# Patient Record
Sex: MALE | Race: BLACK OR AFRICAN AMERICAN | NOT HISPANIC OR LATINO | Employment: PART TIME | URBAN - METROPOLITAN AREA
[De-identification: names, ages, dates, MRNs, and addresses within clinical notes are randomized per-mention and may not be internally consistent; named-entity substitution may affect disease eponyms.]

---

## 2020-06-03 ENCOUNTER — HOSPITAL ENCOUNTER (OUTPATIENT)
Dept: GENERAL RADIOLOGY | Facility: HOSPITAL | Age: 65
Discharge: HOME | End: 2020-06-03
Admitting: INTERNAL MEDICINE

## 2020-06-03 DIAGNOSIS — M17.12 OSTEOARTHRITIS OF LEFT KNEE, UNSPECIFIED OSTEOARTHRITIS TYPE: ICD-10-CM

## 2020-06-03 PROCEDURE — 73560 X-RAY EXAM OF KNEE 1 OR 2: CPT

## 2021-03-22 ENCOUNTER — BULK ORDERING (OUTPATIENT)
Dept: CASE MANAGEMENT | Facility: OTHER | Age: 66
End: 2021-03-22

## 2021-03-22 DIAGNOSIS — Z23 IMMUNIZATION DUE: ICD-10-CM

## 2021-03-25 ENCOUNTER — OFFICE VISIT (OUTPATIENT)
Dept: FAMILY MEDICINE CLINIC | Facility: CLINIC | Age: 66
End: 2021-03-25

## 2021-03-25 VITALS
SYSTOLIC BLOOD PRESSURE: 102 MMHG | TEMPERATURE: 98.7 F | BODY MASS INDEX: 26.82 KG/M2 | HEART RATE: 81 BPM | RESPIRATION RATE: 20 BRPM | WEIGHT: 209 LBS | DIASTOLIC BLOOD PRESSURE: 62 MMHG | OXYGEN SATURATION: 98 % | HEIGHT: 74 IN

## 2021-03-25 DIAGNOSIS — E78.01 FAMILIAL HYPERCHOLESTEROLEMIA: Primary | Chronic | ICD-10-CM

## 2021-03-25 DIAGNOSIS — I10 ESSENTIAL HYPERTENSION: Chronic | ICD-10-CM

## 2021-03-25 PROBLEM — K40.90 INGUINAL HERNIA OF RIGHT SIDE WITHOUT OBSTRUCTION OR GANGRENE: Status: ACTIVE | Noted: 2017-12-11

## 2021-03-25 PROCEDURE — 99203 OFFICE O/P NEW LOW 30 MIN: CPT | Performed by: INTERNAL MEDICINE

## 2021-03-25 RX ORDER — ROSUVASTATIN CALCIUM 10 MG/1
10 TABLET, COATED ORAL
COMMUNITY
Start: 2021-02-04 | End: 2021-08-16 | Stop reason: SDUPTHER

## 2021-03-25 RX ORDER — ALLOPURINOL 100 MG/1
100 TABLET ORAL DAILY
COMMUNITY
Start: 2021-03-02 | End: 2021-08-31

## 2021-03-25 RX ORDER — AMLODIPINE BESYLATE 5 MG/1
10 TABLET ORAL DAILY
COMMUNITY
End: 2021-06-22

## 2021-03-25 RX ORDER — HYDRALAZINE HYDROCHLORIDE 25 MG/1
25 TABLET, FILM COATED ORAL 3 TIMES DAILY
COMMUNITY
End: 2021-06-22

## 2021-03-25 NOTE — PROGRESS NOTES
03/25/2021    CC: Hypertension and Establish Care  .        HPI  Hypertension  This is a chronic problem. The current episode started more than 1 year ago. The problem has been gradually improving since onset. The problem is controlled. Risk factors for coronary artery disease include male gender. Past treatments include beta blockers and ACE inhibitors. Current antihypertension treatment includes ACE inhibitors.        Subjective   Yonatan Hickman Jr. is a 65 y.o. male.      The following portions of the patient's history were reviewed and updated as appropriate: allergies, current medications, past family history, past medical history, past social history, past surgical history and problem list.    Problem List  Patient Active Problem List   Diagnosis   • Inguinal hernia of right side without obstruction or gangrene       Past Medical History  Past Medical History:   Diagnosis Date   • Diabetes mellitus (CMS/HCC)    • Hypertension        Surgical History  History reviewed. No pertinent surgical history.    Family History  Family History   Problem Relation Age of Onset   • Heart disease Mother    • Heart disease Brother        Social History  Social History    Tobacco Use      Smoking status: Current Some Day Smoker        Types: Cigars      Smokeless tobacco: Never Used       Is the Patient a current tobacco user? No    Allergies  No Known Allergies    Current Medications    Current Outpatient Medications:   •  allopurinol (ZYLOPRIM) 100 MG tablet, Take 100 mg by mouth Daily., Disp: , Rfl:   •  amLODIPine (NORVASC) 5 MG tablet, Take 10 mg by mouth Daily., Disp: , Rfl:   •  hydrALAZINE (APRESOLINE) 25 MG tablet, Take 25 mg by mouth 3 (Three) Times a Day., Disp: , Rfl:   •  metFORMIN (GLUCOPHAGE) 500 MG tablet, Take 500 mg by mouth Every Morning., Disp: , Rfl:   •  rosuvastatin (CRESTOR) 10 MG tablet, Take 10 mg by mouth every night at bedtime., Disp: , Rfl:   •  Aspirin Buf,CaCarb-MgCarb-MgO, 81 MG tablet, Take 81  mg by mouth Daily., Disp: , Rfl:      Review of System  Review of Systems   Constitutional: Negative.    Respiratory: Negative.    Cardiovascular: Negative.    Gastrointestinal: Negative.      I have reviewed and confirmed the accuracy of the ROS as documented by the MA/LPN/RN Basim Jacome MD    Vitals:    03/25/21 0740   BP: 102/62   Pulse: 81   Resp: 20   Temp: 98.7 °F (37.1 °C)   SpO2: 98%     Body mass index is 26.83 kg/m².    Objective     Physical Exam  Physical Exam  Cardiovascular:      Rate and Rhythm: Normal rate and regular rhythm.      Pulses: Normal pulses.      Heart sounds: Normal heart sounds.   Pulmonary:      Breath sounds: Normal breath sounds.         Assessment/Plan    This patient presents today for his first visit with me in his office.  This patient relates that he is feeling fine he has had no problems over the past several weeks.  He has a past history of hypertension and hyperlipidemia.  He relates he is taking his medication as prescribed and is maintaining the diet.    His blood pressure today was 102/62 in the left arm sitting position standard cuff.  He is on 2 antihypertensives.    He has a history of diabetes mellitus type 2     We do not have any prior labs to compare.      Diagnoses and all orders for this visit:    1. Familial hypercholesterolemia (Primary)  -     Comprehensive Metabolic Panel  -     Lipid Panel With / Chol / HDL Ratio  -     Urinalysis With Culture If Indicated -  -     CBC & Differential    2. Essential hypertension    Other orders  -     Cancel: Hepatitis C Antibody             Basim Jacome MD  03/25/2021

## 2021-03-26 LAB
ALBUMIN SERPL-MCNC: 4.5 G/DL (ref 3.8–4.8)
ALBUMIN/GLOB SERPL: 1.6 {RATIO} (ref 1.2–2.2)
ALP SERPL-CCNC: 101 IU/L (ref 39–117)
ALT SERPL-CCNC: 23 IU/L (ref 0–44)
AST SERPL-CCNC: 24 IU/L (ref 0–40)
BASOPHILS # BLD AUTO: 0 X10E3/UL (ref 0–0.2)
BASOPHILS NFR BLD AUTO: 1 %
BILIRUB SERPL-MCNC: 0.4 MG/DL (ref 0–1.2)
BUN SERPL-MCNC: 16 MG/DL (ref 8–27)
BUN/CREAT SERPL: 13 (ref 10–24)
CALCIUM SERPL-MCNC: 9.6 MG/DL (ref 8.6–10.2)
CHLORIDE SERPL-SCNC: 101 MMOL/L (ref 96–106)
CHOLEST SERPL-MCNC: 213 MG/DL (ref 100–199)
CHOLEST/HDLC SERPL: 4.3 RATIO (ref 0–5)
CO2 SERPL-SCNC: 23 MMOL/L (ref 20–29)
CREAT SERPL-MCNC: 1.21 MG/DL (ref 0.76–1.27)
EOSINOPHIL # BLD AUTO: 0.3 X10E3/UL (ref 0–0.4)
EOSINOPHIL NFR BLD AUTO: 5 %
ERYTHROCYTE [DISTWIDTH] IN BLOOD BY AUTOMATED COUNT: 14 % (ref 11.6–15.4)
GLOBULIN SER CALC-MCNC: 2.9 G/DL (ref 1.5–4.5)
GLUCOSE SERPL-MCNC: 112 MG/DL (ref 65–99)
HCT VFR BLD AUTO: 44.9 % (ref 37.5–51)
HDLC SERPL-MCNC: 50 MG/DL
HGB BLD-MCNC: 14.5 G/DL (ref 13–17.7)
IMM GRANULOCYTES # BLD AUTO: 0 X10E3/UL (ref 0–0.1)
IMM GRANULOCYTES NFR BLD AUTO: 0 %
LDLC SERPL CALC-MCNC: 121 MG/DL (ref 0–99)
LYMPHOCYTES # BLD AUTO: 1.8 X10E3/UL (ref 0.7–3.1)
LYMPHOCYTES NFR BLD AUTO: 33 %
MCH RBC QN AUTO: 27.1 PG (ref 26.6–33)
MCHC RBC AUTO-ENTMCNC: 32.3 G/DL (ref 31.5–35.7)
MCV RBC AUTO: 84 FL (ref 79–97)
MONOCYTES # BLD AUTO: 0.5 X10E3/UL (ref 0.1–0.9)
MONOCYTES NFR BLD AUTO: 8 %
NEUTROPHILS # BLD AUTO: 3 X10E3/UL (ref 1.4–7)
NEUTROPHILS NFR BLD AUTO: 53 %
PLATELET # BLD AUTO: 248 X10E3/UL (ref 150–450)
POTASSIUM SERPL-SCNC: 4.2 MMOL/L (ref 3.5–5.2)
PROT SERPL-MCNC: 7.4 G/DL (ref 6–8.5)
RBC # BLD AUTO: 5.35 X10E6/UL (ref 4.14–5.8)
SODIUM SERPL-SCNC: 140 MMOL/L (ref 134–144)
TRIGL SERPL-MCNC: 240 MG/DL (ref 0–149)
VLDLC SERPL CALC-MCNC: 42 MG/DL (ref 5–40)
WBC # BLD AUTO: 5.6 X10E3/UL (ref 3.4–10.8)

## 2021-06-22 ENCOUNTER — OFFICE VISIT (OUTPATIENT)
Dept: FAMILY MEDICINE CLINIC | Facility: CLINIC | Age: 66
End: 2021-06-22

## 2021-06-22 VITALS
RESPIRATION RATE: 16 BRPM | HEIGHT: 74 IN | DIASTOLIC BLOOD PRESSURE: 60 MMHG | WEIGHT: 207 LBS | SYSTOLIC BLOOD PRESSURE: 130 MMHG | BODY MASS INDEX: 26.56 KG/M2

## 2021-06-22 DIAGNOSIS — I10 ESSENTIAL HYPERTENSION: Primary | ICD-10-CM

## 2021-06-22 DIAGNOSIS — E11.65 TYPE 2 DIABETES MELLITUS WITH HYPERGLYCEMIA, WITHOUT LONG-TERM CURRENT USE OF INSULIN (HCC): ICD-10-CM

## 2021-06-22 PROCEDURE — 99213 OFFICE O/P EST LOW 20 MIN: CPT | Performed by: INTERNAL MEDICINE

## 2021-06-22 RX ORDER — HYDRALAZINE HYDROCHLORIDE 50 MG/1
50 TABLET, FILM COATED ORAL 2 TIMES DAILY
COMMUNITY
Start: 2021-06-15 | End: 2022-06-09 | Stop reason: SDUPTHER

## 2021-06-22 RX ORDER — AMLODIPINE BESYLATE 10 MG/1
10 TABLET ORAL DAILY
COMMUNITY
Start: 2021-04-05 | End: 2021-07-06

## 2021-06-22 NOTE — PROGRESS NOTES
06/22/2021    CC: Hyperlipidemia (follow up...no other issues)  .        HPI  Hyperlipidemia  This is a chronic problem. The current episode started more than 1 year ago. The problem is uncontrolled. Recent lipid tests were reviewed and are high. Exacerbating diseases include diabetes. There are no known factors aggravating his hyperlipidemia. Current antihyperlipidemic treatment includes statins. The current treatment provides mild improvement of lipids. There are no compliance problems.         Subjective   Yonatan Hickman Jr. is a 65 y.o. male.      The following portions of the patient's history were reviewed and updated as appropriate: allergies, current medications, past family history, past medical history, past social history, past surgical history and problem list.    Problem List  Patient Active Problem List   Diagnosis   • Inguinal hernia of right side without obstruction or gangrene       Past Medical History  Past Medical History:   Diagnosis Date   • Diabetes mellitus (CMS/HCC)    • Hypertension        Surgical History  History reviewed. No pertinent surgical history.    Family History  Family History   Problem Relation Age of Onset   • Heart disease Mother    • Heart disease Brother        Social History  Social History    Tobacco Use      Smoking status: Current Some Day Smoker        Types: Cigars      Smokeless tobacco: Never Used       Is the Patient a current tobacco user? No    Allergies  No Known Allergies    Current Medications    Current Outpatient Medications:   •  allopurinol (ZYLOPRIM) 100 MG tablet, Take 100 mg by mouth Daily., Disp: , Rfl:   •  amLODIPine (NORVASC) 10 MG tablet, Take 10 mg by mouth Daily., Disp: , Rfl:   •  hydrALAZINE (APRESOLINE) 50 MG tablet, Take 50 mg by mouth 2 (two) times a day., Disp: , Rfl:   •  metFORMIN (GLUCOPHAGE) 500 MG tablet, Take 1 tablet by mouth Every Morning., Disp: 90 tablet, Rfl: 1  •  rosuvastatin (CRESTOR) 10 MG tablet, Take 10 mg by mouth every  night at bedtime., Disp: , Rfl:      Review of System  Review of Systems   Cardiovascular: Negative.    Gastrointestinal: Negative.    Endocrine: Negative.      I have reviewed and confirmed the accuracy of the ROS as documented by the MA/LPN/RN Basim Jacome MD    Vitals:    06/22/21 0816   BP: 130/60   Resp: 16     Body mass index is 26.58 kg/m².    Objective     Physical Exam  Physical Exam  Vitals and nursing note reviewed.   Cardiovascular:      Rate and Rhythm: Regular rhythm.      Heart sounds: Normal heart sounds.   Pulmonary:      Effort: Pulmonary effort is normal.   Abdominal:      Palpations: Abdomen is soft.         Assessment/Plan      This pleasant 65-year-old presents at this time for follow-up of hypertension and diabetes mellitus type 2 diet-controlled.  He relates he is feeling fine has had no problems in the interim of visits.  He was seen last in March of this year.  I reviewed his labs from that time showing normal CBC and a normal comprehensive metabolic profile.  His lipid profile was slightly elevated with an LDL of 125.  He previously was on Crestor 10 mg p.o. daily we increase that to 20 mg.  He is not fasting today so we did not have a valid lab opportunity.    Overall he is doing well at this point he continues to play tennis several days a week.                Diagnoses and all orders for this visit:    1. Essential hypertension (Primary)  -     Hepatitis C Antibody    2. Type 2 diabetes mellitus with hyperglycemia, without long-term current use of insulin (CMS/[facility])  -     Hemoglobin A1c      Plan:  1.)  Hemoglobin A1c to evaluate his diet-controlled diabetes  2.)  Schedule for a Medicare wellness review  3.)  Continue Crestor 20 mg p.o. daily.       Basim Jacome MD  06/22/2021

## 2021-06-23 LAB
HBA1C MFR BLD: 6.7 % (ref 4.8–5.6)
HCV AB S/CO SERPL IA: <0.1 S/CO RATIO (ref 0–0.9)

## 2021-07-06 RX ORDER — AMLODIPINE BESYLATE 10 MG/1
TABLET ORAL
Qty: 90 TABLET | Refills: 3 | Status: SHIPPED | OUTPATIENT
Start: 2021-07-06 | End: 2021-11-03

## 2021-08-16 DIAGNOSIS — E78.01 FAMILIAL HYPERCHOLESTEROLEMIA: Primary | ICD-10-CM

## 2021-08-16 RX ORDER — ROSUVASTATIN CALCIUM 10 MG/1
10 TABLET, COATED ORAL
Qty: 90 TABLET | Refills: 1 | Status: SHIPPED | OUTPATIENT
Start: 2021-08-16 | End: 2022-03-04

## 2021-08-31 RX ORDER — ALLOPURINOL 100 MG/1
TABLET ORAL
Qty: 90 TABLET | Refills: 3 | Status: SHIPPED | OUTPATIENT
Start: 2021-08-31 | End: 2022-09-13

## 2021-10-20 ENCOUNTER — TELEPHONE (OUTPATIENT)
Dept: FAMILY MEDICINE CLINIC | Facility: CLINIC | Age: 66
End: 2021-10-20

## 2021-10-20 RX ORDER — TADALAFIL 20 MG/1
TABLET ORAL
Qty: 12 TABLET | Refills: 2 | Status: SHIPPED | OUTPATIENT
Start: 2021-10-20 | End: 2021-10-27 | Stop reason: SDUPTHER

## 2021-10-20 NOTE — TELEPHONE ENCOUNTER
Caller: Yonatan Hickman Jr.    Relationship: Self    Best call back number: [phone]    What medication are you requesting: TADALAFIL 20MG QTY 30 TAKE 1 A DAY    Have you had these symptoms before:    [x] Yes  [] No    Have you been treated for these symptoms before:   [x] Yes  [] No    If a prescription is needed, what is your preferred pharmacy and phone number: [facility] [address] [facility] [address] [facility] [phone] [facility] [phone] FX     Additional notes:  PATIENT STATES THAT HE HAS RECEIVED THIS MEDICATION BEFORE AND NEEDS A REFILL OF IT.     PLEASE CALL AND ADVISE

## 2021-10-26 NOTE — TELEPHONE ENCOUNTER
PATIENT CALLED AGAIN ON THIS MEDICATION.  HE IS OUT OF THE MEDICATION.  PLEASE ADVISE    CALL BACK #: [phone]

## 2021-10-26 NOTE — TELEPHONE ENCOUNTER
I called pt and left message on machine that Dr. Jacome did send in his prescription to the [facility] off [address] on 10/20.  Left message to ask if he wanted him to send it in to the [facility] please inform patient

## 2021-10-27 RX ORDER — TADALAFIL 20 MG/1
TABLET ORAL
Qty: 12 TABLET | Refills: 2 | Status: SHIPPED | OUTPATIENT
Start: 2021-10-27 | End: 2023-02-10

## 2021-10-27 NOTE — TELEPHONE ENCOUNTER
HUB READ TO PATIENT HE VERIFIED HE WANTS THIS SENT TO THE [facility] ON FILE.      CONFIRMED PHARMACY  [place] [id number] [facility], [place] - [address] [facility] [phone] [facility] [phone] FX

## 2021-11-03 RX ORDER — AMLODIPINE BESYLATE 10 MG/1
TABLET ORAL
Qty: 90 TABLET | Refills: 3 | Status: SHIPPED | OUTPATIENT
Start: 2021-11-03 | End: 2022-07-11

## 2022-03-04 DIAGNOSIS — E78.01 FAMILIAL HYPERCHOLESTEROLEMIA: ICD-10-CM

## 2022-03-04 RX ORDER — ROSUVASTATIN CALCIUM 10 MG/1
TABLET, COATED ORAL
Qty: 90 TABLET | Refills: 1 | Status: SHIPPED | OUTPATIENT
Start: 2022-03-04 | End: 2022-10-06 | Stop reason: SDUPTHER

## 2022-06-09 RX ORDER — HYDRALAZINE HYDROCHLORIDE 50 MG/1
50 TABLET, FILM COATED ORAL 2 TIMES DAILY
Qty: 180 TABLET | Refills: 1 | Status: SHIPPED | OUTPATIENT
Start: 2022-06-09 | End: 2022-12-05

## 2022-06-09 NOTE — TELEPHONE ENCOUNTER
Caller: Yonatan Hickman Jr.    Relationship: Self    Best call back number: [phone] (H)    Requested Prescriptions:   hydrALAZINE (APRESOLINE) 50 MG tablet     Pharmacy where request should be sent: [facility]/pharmacy #[id number] - [facility] [place] - [address] AT [facility] [phone] [facility] [phone] FX         Additional details provided by patient: PATIENT CALLED TO REQUEST A MEDICATION REFILL ON HIS MEDICATION. PATIENT STATES THAT HE IS COMPLETELY OUT MEDICATION.      PLEASE CONTACT PATIENT TO ADVISE ONCE PRESCRIPTION HAS BEEN SENT TO THE [facility].      Does the patient have less than a 3 day supply:  [x] Yes  [] No    Nehal Gallardo Rep   06/09/22 09:57 EDT         THANKS

## 2022-07-11 RX ORDER — AMLODIPINE BESYLATE 10 MG/1
TABLET ORAL
Qty: 90 TABLET | Refills: 3 | Status: SHIPPED | OUTPATIENT
Start: 2022-07-11

## 2022-09-13 RX ORDER — ALLOPURINOL 100 MG/1
TABLET ORAL
Qty: 90 TABLET | Refills: 3 | Status: SHIPPED | OUTPATIENT
Start: 2022-09-13

## 2022-10-04 ENCOUNTER — OFFICE VISIT (OUTPATIENT)
Dept: FAMILY MEDICINE CLINIC | Facility: CLINIC | Age: 67
End: 2022-10-04

## 2022-10-04 VITALS
WEIGHT: 204.8 LBS | BODY MASS INDEX: 26.28 KG/M2 | SYSTOLIC BLOOD PRESSURE: 140 MMHG | DIASTOLIC BLOOD PRESSURE: 96 MMHG | RESPIRATION RATE: 16 BRPM | HEIGHT: 74 IN

## 2022-10-04 DIAGNOSIS — Z12.11 COLON CANCER SCREENING: ICD-10-CM

## 2022-10-04 DIAGNOSIS — E78.5 HYPERLIPIDEMIA, UNSPECIFIED HYPERLIPIDEMIA TYPE: ICD-10-CM

## 2022-10-04 DIAGNOSIS — I10 PRIMARY HYPERTENSION: Primary | ICD-10-CM

## 2022-10-04 DIAGNOSIS — R73.09 ELEVATED GLUCOSE: ICD-10-CM

## 2022-10-04 DIAGNOSIS — E11.65 TYPE 2 DIABETES MELLITUS WITH HYPERGLYCEMIA, WITHOUT LONG-TERM CURRENT USE OF INSULIN: ICD-10-CM

## 2022-10-04 DIAGNOSIS — I10 ESSENTIAL HYPERTENSION: ICD-10-CM

## 2022-10-04 DIAGNOSIS — Z13.0 SCREENING FOR DEFICIENCY ANEMIA: ICD-10-CM

## 2022-10-04 DIAGNOSIS — R35.1 NOCTURIA: ICD-10-CM

## 2022-10-04 PROCEDURE — 1170F FXNL STATUS ASSESSED: CPT | Performed by: INTERNAL MEDICINE

## 2022-10-04 PROCEDURE — G0439 PPPS, SUBSEQ VISIT: HCPCS | Performed by: INTERNAL MEDICINE

## 2022-10-04 PROCEDURE — 1159F MED LIST DOCD IN RCRD: CPT | Performed by: INTERNAL MEDICINE

## 2022-10-04 NOTE — PROGRESS NOTES
The ABCs of the Annual Wellness Visit  Subsequent Medicare Wellness Visit    Chief Complaint   Patient presents with   • Medicare Wellness-subsequent     No other issues      Subjective    History of Present Illness:  Yonatan Hickman Jr. is a 66 y.o. male who presents for a Subsequent Medicare Wellness Visit.    The following portions of the patient's history were reviewed and   updated as appropriate: allergies, current medications, past family history, past medical history, past social history, past surgical history and problem list.    Compared to one year ago, the patient feels his physical   health is the same.    Compared to one year ago, the patient feels his mental   health is better.    Recent Hospitalizations:  He was not admitted to the hospital during the last year.       Current Medical Providers:  Patient Care Team:  Basim Jacome MD as PCP - General (Internal Medicine)    Outpatient Medications Prior to Visit   Medication Sig Dispense Refill   • allopurinol (ZYLOPRIM) 100 MG tablet TAKE 1 TABLET BY MOUTH EVERY DAY 90 tablet 3   • amLODIPine (NORVASC) 10 MG tablet TAKE 1 TABLET BY MOUTH EVERY DAY 90 tablet 3   • hydrALAZINE (APRESOLINE) 50 MG tablet Take 1 tablet by mouth 2 (Two) Times a Day. 180 tablet 1   • metFORMIN (GLUCOPHAGE) 500 MG tablet TAKE 1 TABLET BY MOUTH EVERY MORNING. 90 tablet 1   • rosuvastatin (CRESTOR) 10 MG tablet TAKE 1 TABLET BY MOUTH EVERYDAY AT BEDTIME 90 tablet 1   • tadalafil (Cialis) 20 MG tablet Use weekly as needed 12 tablet 2     No facility-administered medications prior to visit.       No opioid medication identified on active medication list. I have reviewed chart for other potential  high risk medication/s and harmful drug interactions in the elderly.          Aspirin is not on active medication list.      Patient Active Problem List   Diagnosis   • Inguinal hernia of right side without obstruction or gangrene     Advance Care Planning  Advance Directive is not on  "file.  ACP discussion was held with the patient during this visit. Patient does not have an advance directive, information provided.    Review of Systems   Constitutional: Negative.    HENT: Negative.    Eyes: Negative.    Respiratory: Negative.    Cardiovascular: Negative.    Gastrointestinal: Negative.    Endocrine: Negative.    Genitourinary: Negative.    Musculoskeletal: Negative.    Skin: Negative.    Allergic/Immunologic: Negative.    Neurological: Negative.    Hematological: Negative.    Psychiatric/Behavioral: Negative.          Objective    Vitals:    10/04/22 0748   BP: 140/96   Resp: 16   Weight: 92.9 kg (204 lb 12.8 oz)   Height: 188 cm (74\")     Body mass index is 26.29 kg/m².  BMI has not been calculated during today's encounter.     Does the patient have evidence of cognitive impairment? No    Physical Exam  Vitals and nursing note reviewed.   Constitutional:       Appearance: He is well-developed.   HENT:      Head: Normocephalic and atraumatic.   Eyes:      Conjunctiva/sclera: Conjunctivae normal.   Cardiovascular:      Rate and Rhythm: Normal rate and regular rhythm.      Heart sounds: Normal heart sounds.   Pulmonary:      Effort: Pulmonary effort is normal.      Breath sounds: Normal breath sounds.   Abdominal:      General: Bowel sounds are normal.      Palpations: Abdomen is soft.   Musculoskeletal:         General: Normal range of motion.      Cervical back: Normal range of motion and neck supple.   Skin:     General: Skin is warm and dry.   Neurological:      Mental Status: He is alert and oriented to person, place, and time.   Psychiatric:         Behavior: Behavior normal.                 HEALTH RISK ASSESSMENT    Smoking Status:  Social History     Tobacco Use   Smoking Status Current Some Day Smoker   • Packs/day: 0.25   • Years: 10.00   • Pack years: 2.50   • Types: Cigars   • Start date: 2012   Smokeless Tobacco Never Used     Alcohol Consumption:  Social History     Substance and " Sexual Activity   Alcohol Use Yes     Fall Risk Screen:    MUKESH Fall Risk Assessment was completed, and patient is at LOW risk for falls.Assessment completed on:10/4/2022    Depression Screening:  PHQ-2/PHQ-9 Depression Screening 10/4/2022   Retired Total Score -   Little Interest or Pleasure in Doing Things 0-->not at all   Feeling Down, Depressed or Hopeless 0-->not at all   PHQ-9: Brief Depression Severity Measure Score 0       Health Habits and Functional and Cognitive Screening:  Functional & Cognitive Status 10/4/2022   Do you have difficulty preparing food and eating? No   Do you have difficulty bathing yourself, getting dressed or grooming yourself? No   Do you have difficulty using the toilet? No   Do you have difficulty moving around from place to place? No   Do you have trouble with steps or getting out of a bed or a chair? No   Current Diet Well Balanced Diet   Dental Exam Up to date   Eye Exam Not up to date   Exercise (times per week) 7 times per week   Current Exercises Include Tennis;Bicycling Outdoors;Stationary Bicycling/Spin Class   Do you need help using the phone?  No   Are you deaf or do you have serious difficulty hearing?  No   Do you need help with transportation? No   Do you need help shopping? No   Do you need help preparing meals?  No   Do you need help with housework?  No   Do you need help with laundry? No   Do you need help taking your medications? No   Do you need help managing money? No   Do you ever drive or ride in a car without wearing a seat belt? No   Have you felt unusual stress, anger or loneliness in the last month? No   Who do you live with? Spouse   If you need help, do you have trouble finding someone available to you? No   Have you been bothered in the last four weeks by sexual problems? No   Do you have difficulty concentrating, remembering or making decisions? No       Age-appropriate Screening Schedule:  Refer to the list below for future screening recommendations  based on patient's age, sex and/or medical conditions. Orders for these recommended tests are listed in the plan section. The patient has been provided with a written plan.    Health Maintenance   Topic Date Due   • URINE MICROALBUMIN  Never done   • TDAP/TD VACCINES (1 - Tdap) Never done   • DIABETIC FOOT EXAM  Never done   • DIABETIC EYE EXAM  Never done   • HEMOGLOBIN A1C  12/22/2021   • LIPID PANEL  03/25/2022   • INFLUENZA VACCINE  08/01/2022   • ZOSTER VACCINE (1 of 2) 06/14/2023 (Originally 11/8/2004)              Assessment & Plan      This 66-year-old patient presents today for Medicare wellness review.  He relates he is feeling well having had no problems in the interim of visits.  He does have a history of diabetes mellitus type 2 and has been lost to follow-up for about a year.  His last hemoglobin A1c was 6.7.    Regarding anticipatory guidance: We discussed the importance of a low-cholesterol diet.  I gave him a low-cholesterol diet sheet for review.      CMS Preventative Services Quick Reference  Risk Factors Identified During Encounter  None Identified  The above risks/problems have been discussed with the patient.  Follow up actions/plans if indicated are seen below in the Assessment/Plan Section.  Pertinent information has been shared with the patient in the After Visit Summary.    Diagnoses and all orders for this visit:    1. Primary hypertension (Primary)  -     Comprehensive Metabolic Panel    2. Hyperlipidemia, unspecified hyperlipidemia type  -     Lipid Panel With / Chol / HDL Ratio    3. Nocturia  -     Urinalysis With Culture If Indicated -    4. Screening for deficiency anemia  -     CBC & Differential    5. Elevated glucose  -     Hemoglobin A1c    6. Essential hypertension    7. Type 2 diabetes mellitus with hyperglycemia, without long-term current use of insulin (HCC)        Follow Up:   Return in about 4 months (around 2/4/2023) for Diabetic follow-up.     An After Visit Summary and  PPPS were made available to the patient.             Plan:  1.)  Follow-up in 4 months for diabetes  2.)  Comprehensive metabolic profile, CBC.

## 2022-10-05 LAB
ALBUMIN SERPL-MCNC: 4.5 G/DL (ref 3.5–5.2)
ALBUMIN/GLOB SERPL: 1.9 G/DL
ALP SERPL-CCNC: 82 U/L (ref 39–117)
ALT SERPL-CCNC: 15 U/L (ref 1–41)
AST SERPL-CCNC: 26 U/L (ref 1–40)
BASOPHILS # BLD AUTO: 0.05 10*3/MM3 (ref 0–0.2)
BASOPHILS NFR BLD AUTO: 1.2 % (ref 0–1.5)
BILIRUB SERPL-MCNC: 0.4 MG/DL (ref 0–1.2)
BUN SERPL-MCNC: 9 MG/DL (ref 8–23)
BUN/CREAT SERPL: 8.5 (ref 7–25)
CALCIUM SERPL-MCNC: 9.5 MG/DL (ref 8.6–10.5)
CHLORIDE SERPL-SCNC: 103 MMOL/L (ref 98–107)
CHOLEST SERPL-MCNC: 315 MG/DL (ref 0–200)
CHOLEST/HDLC SERPL: 5 {RATIO}
CO2 SERPL-SCNC: 28 MMOL/L (ref 22–29)
CREAT SERPL-MCNC: 1.06 MG/DL (ref 0.76–1.27)
EGFRCR SERPLBLD CKD-EPI 2021: 76.9 ML/MIN/1.73
EOSINOPHIL # BLD AUTO: 0.19 10*3/MM3 (ref 0–0.4)
EOSINOPHIL NFR BLD AUTO: 4.5 % (ref 0.3–6.2)
ERYTHROCYTE [DISTWIDTH] IN BLOOD BY AUTOMATED COUNT: 13.6 % (ref 12.3–15.4)
GLOBULIN SER CALC-MCNC: 2.4 GM/DL
GLUCOSE SERPL-MCNC: 122 MG/DL (ref 65–99)
HBA1C MFR BLD: 6.8 % (ref 4.8–5.6)
HCT VFR BLD AUTO: 44.6 % (ref 37.5–51)
HDLC SERPL-MCNC: 63 MG/DL (ref 40–60)
HGB BLD-MCNC: 14.7 G/DL (ref 13–17.7)
IMM GRANULOCYTES # BLD AUTO: 0.01 10*3/MM3 (ref 0–0.05)
IMM GRANULOCYTES NFR BLD AUTO: 0.2 % (ref 0–0.5)
LDLC SERPL CALC-MCNC: 225 MG/DL (ref 0–100)
LYMPHOCYTES # BLD AUTO: 1.27 10*3/MM3 (ref 0.7–3.1)
LYMPHOCYTES NFR BLD AUTO: 30 % (ref 19.6–45.3)
MCH RBC QN AUTO: 27.5 PG (ref 26.6–33)
MCHC RBC AUTO-ENTMCNC: 33 G/DL (ref 31.5–35.7)
MCV RBC AUTO: 83.4 FL (ref 79–97)
MONOCYTES # BLD AUTO: 0.41 10*3/MM3 (ref 0.1–0.9)
MONOCYTES NFR BLD AUTO: 9.7 % (ref 5–12)
NEUTROPHILS # BLD AUTO: 2.3 10*3/MM3 (ref 1.7–7)
NEUTROPHILS NFR BLD AUTO: 54.4 % (ref 42.7–76)
NRBC BLD AUTO-RTO: 0 /100 WBC (ref 0–0.2)
PLATELET # BLD AUTO: 244 10*3/MM3 (ref 140–450)
POTASSIUM SERPL-SCNC: 4.3 MMOL/L (ref 3.5–5.2)
PROT SERPL-MCNC: 6.9 G/DL (ref 6–8.5)
RBC # BLD AUTO: 5.35 10*6/MM3 (ref 4.14–5.8)
SODIUM SERPL-SCNC: 138 MMOL/L (ref 136–145)
TRIGL SERPL-MCNC: 148 MG/DL (ref 0–150)
VLDLC SERPL CALC-MCNC: 27 MG/DL (ref 5–40)
WBC # BLD AUTO: 4.23 10*3/MM3 (ref 3.4–10.8)

## 2022-10-06 DIAGNOSIS — E78.5 HYPERLIPIDEMIA, UNSPECIFIED HYPERLIPIDEMIA TYPE: Primary | ICD-10-CM

## 2022-10-06 DIAGNOSIS — E78.01 FAMILIAL HYPERCHOLESTEROLEMIA: ICD-10-CM

## 2022-10-06 RX ORDER — ROSUVASTATIN CALCIUM 20 MG/1
TABLET, COATED ORAL
Qty: 90 TABLET | Refills: 3 | Status: SHIPPED | OUTPATIENT
Start: 2022-10-06 | End: 2022-12-15 | Stop reason: SDUPTHER

## 2022-12-05 RX ORDER — HYDRALAZINE HYDROCHLORIDE 50 MG/1
TABLET, FILM COATED ORAL
Qty: 180 TABLET | Refills: 1 | Status: SHIPPED | OUTPATIENT
Start: 2022-12-05

## 2022-12-15 ENCOUNTER — OFFICE VISIT (OUTPATIENT)
Dept: FAMILY MEDICINE CLINIC | Facility: CLINIC | Age: 67
End: 2022-12-15

## 2022-12-15 VITALS
BODY MASS INDEX: 25.8 KG/M2 | WEIGHT: 201 LBS | HEIGHT: 74 IN | RESPIRATION RATE: 16 BRPM | SYSTOLIC BLOOD PRESSURE: 130 MMHG | DIASTOLIC BLOOD PRESSURE: 70 MMHG

## 2022-12-15 DIAGNOSIS — E78.01 FAMILIAL HYPERCHOLESTEROLEMIA: ICD-10-CM

## 2022-12-15 DIAGNOSIS — J01.80 OTHER ACUTE SINUSITIS, RECURRENCE NOT SPECIFIED: Primary | ICD-10-CM

## 2022-12-15 DIAGNOSIS — R42 VERTIGO: ICD-10-CM

## 2022-12-15 PROCEDURE — 99214 OFFICE O/P EST MOD 30 MIN: CPT | Performed by: INTERNAL MEDICINE

## 2022-12-15 RX ORDER — MECLIZINE HYDROCHLORIDE 25 MG/1
TABLET ORAL
Qty: 20 TABLET | Refills: 0 | Status: SHIPPED | OUTPATIENT
Start: 2022-12-15 | End: 2022-12-27

## 2022-12-15 RX ORDER — FEXOFENADINE HCL AND PSEUDOEPHEDRINE HCI 180; 240 MG/1; MG/1
TABLET, EXTENDED RELEASE ORAL
Qty: 10 TABLET | Refills: 0 | Status: SHIPPED | OUTPATIENT
Start: 2022-12-15

## 2022-12-15 RX ORDER — ROSUVASTATIN CALCIUM 20 MG/1
TABLET, COATED ORAL
Qty: 90 TABLET | Refills: 3 | Status: SHIPPED | OUTPATIENT
Start: 2022-12-15

## 2022-12-15 NOTE — PROGRESS NOTES
12/15/2022    CC: Dizziness (Started 5 days ago off/on.---no other issues)  .        HPI  History of Present Illness  This 67-year-old patient presents today for evaluation of dizziness that started about 5 days or so ago.  He relates it is episodic in nature.  He relates he originally awoke awakened with it but after several minutes it seemed to go away but then came back.       Subjective   Yonatan Hickman Jr. is a 67 y.o. male.      The following portions of the patient's history were reviewed and updated as appropriate: allergies, current medications, past family history, past medical history, past social history, past surgical history and problem list.    Problem List  Patient Active Problem List   Diagnosis   • Inguinal hernia of right side without obstruction or gangrene       Past Medical History  Past Medical History:   Diagnosis Date   • Diabetes mellitus (HCC)    • Hypertension        Surgical History  History reviewed. No pertinent surgical history.    Family History  Family History   Problem Relation Age of Onset   • Heart disease Mother    • Heart disease Brother        Social History  Social History    Tobacco Use      Smoking status: Some Days        Packs/day: 0.25        Years: 10.00        Pack years: 2.5        Types: Cigars, Cigarettes        Start date: 2012      Smokeless tobacco: Never       Is the Patient a current tobacco user? No    Allergies  No Known Allergies    Current Medications    Current Outpatient Medications:   •  allopurinol (ZYLOPRIM) 100 MG tablet, TAKE 1 TABLET BY MOUTH EVERY DAY, Disp: 90 tablet, Rfl: 3  •  amLODIPine (NORVASC) 10 MG tablet, TAKE 1 TABLET BY MOUTH EVERY DAY, Disp: 90 tablet, Rfl: 3  •  hydrALAZINE (APRESOLINE) 50 MG tablet, TAKE 1 TABLET BY MOUTH TWICE A DAY, Disp: 180 tablet, Rfl: 1  •  metFORMIN (GLUCOPHAGE) 500 MG tablet, TAKE 1 TABLET BY MOUTH EVERY DAY IN THE MORNING, Disp: 90 tablet, Rfl: 1  •  tadalafil (Cialis) 20 MG tablet, Use weekly as needed,  Disp: 12 tablet, Rfl: 2  •  rosuvastatin (CRESTOR) 20 MG tablet, Take 1 tablet at bedtime, Disp: 90 tablet, Rfl: 3     Review of System  Review of Systems   Constitutional: Negative.    HENT: Negative.    Eyes: Negative.    Respiratory: Negative.    Cardiovascular: Negative.      I have reviewed and confirmed the accuracy of the ROS as documented by the MA/LPN/RN Basim Jacome MD    Vitals:    12/15/22 0749   BP: 130/70   Resp: 16     Body mass index is 25.81 kg/m².    Objective     Physical Exam  Physical Exam  Constitutional:       Appearance: Normal appearance.   HENT:      Head: Normocephalic.   Eyes:      Extraocular Movements: Extraocular movements intact.      Conjunctiva/sclera: Conjunctivae normal.      Pupils: Pupils are equal, round, and reactive to light.   Cardiovascular:      Rate and Rhythm: Normal rate and regular rhythm.      Pulses: Normal pulses.      Heart sounds: Normal heart sounds.   Pulmonary:      Effort: Pulmonary effort is normal.   Musculoskeletal:      Cervical back: Normal range of motion.   Neurological:      Mental Status: He is alert.         Assessment & Plan    This 67-year-old patient presents at this time for recent onset of vertigo x3 to 4 days.  He is able to ambulate without difficulty but feels unsteady.  He is able to drive without problems.  He relates that it started around Sunday morning when he awakened from his beer.  After a while he felt nauseated threw up and then felt a little better.  Since then its been very bothersome.  He relates he had similar symptoms years ago when he was told he had some sinus problems.    Tympanic membrane was bulging.  Remainder of the ear nose and throat exam was normal.    Epley maneuver was negative.  There is no nystagmus.    His blood pressure is 130/70 in the left arm sitting position standard cuff.  He is lost 3 pounds from his last visit.    Diagnoses and all orders for this visit:    1. Other acute sinusitis, recurrence not  specified (Primary)    2. Vertigo      Plan:  1.)  Allegra-D 1 tab p.o. nightly  2.)  Meclizine 25 mg 1 tab p.o. twice daily.  3.)  The patient is to call me if no improvement in 48 hours.       Basim Jacome MD  12/15/2022

## 2022-12-27 RX ORDER — MECLIZINE HYDROCHLORIDE 25 MG/1
TABLET ORAL
Qty: 20 TABLET | Refills: 0 | Status: SHIPPED | OUTPATIENT
Start: 2022-12-27 | End: 2023-02-08

## 2023-02-08 RX ORDER — MECLIZINE HYDROCHLORIDE 25 MG/1
TABLET ORAL
Qty: 20 TABLET | Refills: 0 | Status: SHIPPED | OUTPATIENT
Start: 2023-02-08

## 2023-02-08 NOTE — TELEPHONE ENCOUNTER
Rx Refill Note  Requested Prescriptions     Pending Prescriptions Disp Refills   • meclizine (ANTIVERT) 25 MG tablet [Pharmacy Med Name: MECLIZINE 25 MG TABLET] 20 tablet 0     Sig: TAKE 1 TABLET BY MOUTH TWICE A DAY      Last office visit with prescribing clinician: 12/15/2022   Last telemedicine visit with prescribing clinician: Visit date not found   Next office visit with prescribing clinician: Visit date not found                         Would you like a call back once the refill request has been completed: [] Yes [] No    If the office needs to give you a call back, can they leave a voicemail: [] Yes [] No    Jake Dennison MA  02/08/23, 07:09 EST

## 2023-02-10 RX ORDER — TADALAFIL 20 MG/1
TABLET ORAL
Qty: 12 TABLET | Refills: 4 | Status: SHIPPED | OUTPATIENT
Start: 2023-02-10

## 2023-02-10 RX ORDER — TADALAFIL 20 MG/1
TABLET ORAL
Qty: 12 TABLET | Refills: 0 | Status: SHIPPED | OUTPATIENT
Start: 2023-02-10 | End: 2023-02-10 | Stop reason: SDUPTHER

## 2023-02-10 NOTE — TELEPHONE ENCOUNTER
Rx Refill Note  Requested Prescriptions     Pending Prescriptions Disp Refills   • tadalafil (CIALIS) 20 MG tablet [Pharmacy Med Name: Tadalafil 20 MG Oral Tablet] 12 tablet 0     Sig: TAKE 1 TABLET BY MOUTH ONCE A WEEK AS NEEDED      Last office visit with prescribing clinician: 12/15/2022   Last telemedicine visit with prescribing clinician: 2/10/2023   Next office visit with prescribing clinician: 2/10/2023                         Would you like a call back once the refill request has been completed: [] Yes [] No    If the office needs to give you a call back, can they leave a voicemail: [] Yes [] No    Jake Dennison MA  02/10/23, 09:05 EST

## 2023-08-24 ENCOUNTER — OFFICE VISIT (OUTPATIENT)
Dept: FAMILY MEDICINE CLINIC | Facility: CLINIC | Age: 68
End: 2023-08-24
Payer: MEDICARE

## 2023-08-24 VITALS
BODY MASS INDEX: 26.56 KG/M2 | WEIGHT: 207 LBS | HEIGHT: 74 IN | DIASTOLIC BLOOD PRESSURE: 78 MMHG | SYSTOLIC BLOOD PRESSURE: 110 MMHG

## 2023-08-24 DIAGNOSIS — Z13.6 SCREENING FOR HYPERTENSION: Primary | ICD-10-CM

## 2023-08-24 DIAGNOSIS — R35.1 NOCTURIA: ICD-10-CM

## 2023-08-24 DIAGNOSIS — Z11.1 TUBERCULOSIS SCREENING: ICD-10-CM

## 2023-08-24 DIAGNOSIS — Z13.0 SCREENING FOR DEFICIENCY ANEMIA: ICD-10-CM

## 2023-08-24 DIAGNOSIS — Z13.220 SCREENING FOR HYPERLIPIDEMIA: ICD-10-CM

## 2023-08-24 DIAGNOSIS — Z13.29 SCREENING FOR HYPOTHYROIDISM: ICD-10-CM

## 2023-08-24 DIAGNOSIS — E11.43 TYPE 2 DIABETES MELLITUS WITH DIABETIC AUTONOMIC NEUROPATHY, WITHOUT LONG-TERM CURRENT USE OF INSULIN: ICD-10-CM

## 2023-08-24 NOTE — PROGRESS NOTES
08/24/2023    Assessment & Plan     This 67-year-old patient presents at this time for follow-up of diabetes mellitus and for completion of physical examination.  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  He relates not had any chest pain or shortness of breath in the interim of visits.  Unfortunately he has diabetes mellitus type 2 and has not been followed up since October 2022.  Patient relates he feels fine and does not think that he really needs to be checked.  Diagnoses and all orders for this visit:    1. Screening for hypertension (Primary)  -     Comprehensive Metabolic Panel    2. Screening for hyperlipidemia  -     Lipid Panel With / Chol / HDL Ratio    3. Nocturia  -     Urinalysis With Culture If Indicated -    4. Screening for deficiency anemia  -     CBC & Differential    5. Screening for hypothyroidism  -     T4, Free  -     TSH    6. Type 2 diabetes mellitus with diabetic autonomic neuropathy, without long-term current use of insulin  -     Hemoglobin A1c    7. Tuberculosis screening  -     QuantiFERON-TB Gold Plus; Future    Other orders  -     Microscopic Examination -  -     QuantiFERON-TB Gold Plus  -     QuantiFERON-TB Gold Plus      Plan:  1.)  Follow-up for diabetes in the next few months       CC: Hypertension (F/U--form for work----no other issues)  .        HPI  Hypertension       Subjective   Yonatan Hickman Jr. is a 67 y.o. male.      The following portions of the patient's history were reviewed and updated as appropriate: allergies, current medications, past family history, past medical history, past social history, past surgical history, and problem list.    Problem List  Patient Active Problem List   Diagnosis    Inguinal hernia of right side without obstruction or gangrene       Past Medical History  Past Medical History:   Diagnosis Date    Diabetes mellitus     Hypertension        Surgical History  History reviewed. No pertinent surgical  history.    Family History  Family History   Problem Relation Age of Onset    Heart disease Mother     Heart disease Brother        Social History  Social History    Tobacco Use      Smoking status: Some Days        Packs/day: 0.25        Years: 10.00        Pack years: 2.5        Types: Cigars, Cigarettes        Start date: 2012      Smokeless tobacco: Never       Is the Patient a current tobacco user? No    Allergies  No Known Allergies    Current Medications    Current Outpatient Medications:     allopurinol (ZYLOPRIM) 100 MG tablet, TAKE 1 TABLET BY MOUTH EVERY DAY, Disp: 90 tablet, Rfl: 3    fexofenadine-pseudoephedrine (Allegra-D Allergy & Congestion) 180-240 MG per 24 hr tablet, Take 1 tablet p.o. nightly, Disp: 10 tablet, Rfl: 0    hydrALAZINE (APRESOLINE) 50 MG tablet, TAKE 1 TABLET BY MOUTH TWICE A DAY, Disp: 180 tablet, Rfl: 1    meclizine (ANTIVERT) 25 MG tablet, TAKE 1 TABLET BY MOUTH TWICE A DAY, Disp: 20 tablet, Rfl: 0    metFORMIN (GLUCOPHAGE) 500 MG tablet, TAKE 1 TABLET BY MOUTH EVERY DAY IN THE MORNING, Disp: 90 tablet, Rfl: 1    rosuvastatin (CRESTOR) 20 MG tablet, Take 1 tablet at bedtime, Disp: 90 tablet, Rfl: 3    tadalafil (CIALIS) 20 MG tablet, TAKE 1 TABLET BY MOUTH ONCE A WEEK AS NEEDED, Disp: 12 tablet, Rfl: 4    amLODIPine (NORVASC) 10 MG tablet, TAKE 1 TABLET BY MOUTH EVERY DAY, Disp: 90 tablet, Rfl: 3     Review of System  Review of Systems   Constitutional: Negative.    HENT: Negative.     Eyes: Negative.    Respiratory: Negative.     Cardiovascular: Negative.    Gastrointestinal: Negative.    Endocrine: Negative.    Genitourinary: Negative.    Musculoskeletal: Negative.    Skin: Negative.    Allergic/Immunologic: Negative.    Neurological: Negative.    Hematological: Negative.    Psychiatric/Behavioral: Negative.     I have reviewed and confirmed the accuracy of the ROS as documented by the MA/LPN/RN Basim Jacome MD    Vitals:    08/24/23 1600   BP: 110/78     Body mass index is  26.58 kg/mý.    Objective     Physical Exam  Physical Exam  Vitals and nursing note reviewed.   Constitutional:       Appearance: He is well-developed.   HENT:      Head: Normocephalic and atraumatic.   Eyes:      Conjunctiva/sclera: Conjunctivae normal.   Cardiovascular:      Rate and Rhythm: Normal rate and regular rhythm.      Heart sounds: Normal heart sounds.   Pulmonary:      Effort: Pulmonary effort is normal.      Breath sounds: Normal breath sounds.   Abdominal:      General: Bowel sounds are normal.      Palpations: Abdomen is soft.   Musculoskeletal:         General: Normal range of motion.      Cervical back: Normal range of motion and neck supple.   Skin:     General: Skin is warm and dry.   Neurological:      Mental Status: He is alert and oriented to person, place, and time.   Psychiatric:         Behavior: Behavior normal.         Basim Jacome MD  08/24/2023

## 2023-08-30 LAB
ALBUMIN SERPL-MCNC: 4.8 G/DL (ref 3.9–4.9)
ALBUMIN/GLOB SERPL: 2.1 {RATIO} (ref 1.2–2.2)
ALP SERPL-CCNC: 85 IU/L (ref 44–121)
ALT SERPL-CCNC: 15 IU/L (ref 0–44)
APPEARANCE UR: CLEAR
AST SERPL-CCNC: 19 IU/L (ref 0–40)
BACTERIA #/AREA URNS HPF: NORMAL /[HPF]
BASOPHILS # BLD AUTO: 0 X10E3/UL (ref 0–0.2)
BASOPHILS NFR BLD AUTO: 1 %
BILIRUB SERPL-MCNC: 0.4 MG/DL (ref 0–1.2)
BILIRUB UR QL STRIP: NEGATIVE
BUN SERPL-MCNC: 17 MG/DL (ref 8–27)
BUN/CREAT SERPL: 15 (ref 10–24)
CALCIUM SERPL-MCNC: 9.6 MG/DL (ref 8.6–10.2)
CASTS URNS QL MICRO: NORMAL /LPF
CHLORIDE SERPL-SCNC: 103 MMOL/L (ref 96–106)
CHOLEST SERPL-MCNC: 214 MG/DL (ref 100–199)
CHOLEST/HDLC SERPL: 3.5 RATIO (ref 0–5)
CO2 SERPL-SCNC: 24 MMOL/L (ref 20–29)
COLOR UR: YELLOW
CREAT SERPL-MCNC: 1.17 MG/DL (ref 0.76–1.27)
EGFRCR SERPLBLD CKD-EPI 2021: 68 ML/MIN/1.73
EOSINOPHIL # BLD AUTO: 0.2 X10E3/UL (ref 0–0.4)
EOSINOPHIL NFR BLD AUTO: 4 %
EPI CELLS #/AREA URNS HPF: NORMAL /HPF (ref 0–10)
ERYTHROCYTE [DISTWIDTH] IN BLOOD BY AUTOMATED COUNT: 13.9 % (ref 11.6–15.4)
GAMMA INTERFERON BACKGROUND BLD IA-ACNC: 0.03 IU/ML
GLOBULIN SER CALC-MCNC: 2.3 G/DL (ref 1.5–4.5)
GLUCOSE SERPL-MCNC: 129 MG/DL (ref 70–99)
GLUCOSE UR QL STRIP: NEGATIVE
HBA1C MFR BLD: 7.2 % (ref 4.8–5.6)
HCT VFR BLD AUTO: 43.8 % (ref 37.5–51)
HDLC SERPL-MCNC: 61 MG/DL
HGB BLD-MCNC: 14.4 G/DL (ref 13–17.7)
HGB UR QL STRIP: NEGATIVE
IMM GRANULOCYTES # BLD AUTO: 0 X10E3/UL (ref 0–0.1)
IMM GRANULOCYTES NFR BLD AUTO: 0 %
KETONES UR QL STRIP: NEGATIVE
LDLC SERPL CALC-MCNC: 135 MG/DL (ref 0–99)
LEUKOCYTE ESTERASE UR QL STRIP: NEGATIVE
LYMPHOCYTES # BLD AUTO: 1.7 X10E3/UL (ref 0.7–3.1)
LYMPHOCYTES NFR BLD AUTO: 32 %
M TB IFN-G BLD-IMP: NEGATIVE
M TB IFN-G CD4+ T-CELLS BLD-ACNC: 0.18 IU/ML
M TBIFN-G CD4+ CD8+T-CELLS BLD-ACNC: 0.04 IU/ML
MCH RBC QN AUTO: 27.3 PG (ref 26.6–33)
MCHC RBC AUTO-ENTMCNC: 32.9 G/DL (ref 31.5–35.7)
MCV RBC AUTO: 83 FL (ref 79–97)
MICRO URNS: NORMAL
MICRO URNS: NORMAL
MITOGEN IGNF BLD-ACNC: >10 IU/ML
MONOCYTES # BLD AUTO: 0.5 X10E3/UL (ref 0.1–0.9)
MONOCYTES NFR BLD AUTO: 9 %
NEUTROPHILS # BLD AUTO: 2.8 X10E3/UL (ref 1.4–7)
NEUTROPHILS NFR BLD AUTO: 54 %
NITRITE UR QL STRIP: NEGATIVE
PH UR STRIP: 7 [PH] (ref 5–7.5)
PLATELET # BLD AUTO: 213 X10E3/UL (ref 150–450)
POTASSIUM SERPL-SCNC: 4.5 MMOL/L (ref 3.5–5.2)
PROT SERPL-MCNC: 7.1 G/DL (ref 6–8.5)
PROT UR QL STRIP: NORMAL
QUANTIFERON INCUBATION: NORMAL
RBC # BLD AUTO: 5.27 X10E6/UL (ref 4.14–5.8)
RBC #/AREA URNS HPF: NORMAL /HPF (ref 0–2)
SERVICE CMNT-IMP: NORMAL
SODIUM SERPL-SCNC: 140 MMOL/L (ref 134–144)
SP GR UR STRIP: 1.02 (ref 1–1.03)
T4 FREE SERPL-MCNC: 1.11 NG/DL (ref 0.82–1.77)
TRIGL SERPL-MCNC: 104 MG/DL (ref 0–149)
TSH SERPL DL<=0.005 MIU/L-ACNC: 4.16 UIU/ML (ref 0.45–4.5)
URINALYSIS REFLEX: NORMAL
UROBILINOGEN UR STRIP-MCNC: 0.2 MG/DL (ref 0.2–1)
VLDLC SERPL CALC-MCNC: 18 MG/DL (ref 5–40)
WBC # BLD AUTO: 5.3 X10E3/UL (ref 3.4–10.8)
WBC #/AREA URNS HPF: NORMAL /HPF (ref 0–5)

## 2023-08-31 ENCOUNTER — TELEPHONE (OUTPATIENT)
Dept: FAMILY MEDICINE CLINIC | Facility: CLINIC | Age: 68
End: 2023-08-31

## 2023-08-31 NOTE — TELEPHONE ENCOUNTER
Caller: Yonatan Hickman Jr.    Relationship: Self    Best call back number: [phone] OK TO LEAVE A VOICE MAIL     Who are you requesting to speak with (clinical staff, provider,  specific staff member): CLINICAL STAFF     What was the call regarding: WANTING TO KNOW IF THE PHYSICAL FORM TO BECOME A  WITH [facility] HAS BEEN FILLED OUT AND IF ITS READY TO BE PICKED UP     PLEASE CALL AND ADVISE

## 2023-09-05 RX ORDER — AMLODIPINE BESYLATE 10 MG/1
TABLET ORAL
Qty: 90 TABLET | Refills: 3 | Status: SHIPPED | OUTPATIENT
Start: 2023-09-05

## 2023-09-13 ENCOUNTER — TELEPHONE (OUTPATIENT)
Dept: FAMILY MEDICINE CLINIC | Facility: CLINIC | Age: 68
End: 2023-09-13

## 2023-09-13 NOTE — TELEPHONE ENCOUNTER
Caller: Yonatan Hickman Jr.    Relationship to patient: Self    Best call back number: [phone]     Patient is needing: PATIENT IS CALLING FOR STATUS OF PAPERWORK THAT HE DROPPED OFF 2 WEEKS AGO. PT STATES HE RECEIVED A MY CHART MESSAGE THAT IT WOULD BE READY ON MONDAY 9/11. 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

## 2023-12-07 RX ORDER — ALLOPURINOL 100 MG/1
TABLET ORAL
Qty: 90 TABLET | Refills: 3 | Status: SHIPPED | OUTPATIENT
Start: 2023-12-07

## 2024-01-22 DIAGNOSIS — E78.01 FAMILIAL HYPERCHOLESTEROLEMIA: ICD-10-CM

## 2024-01-22 RX ORDER — ROSUVASTATIN CALCIUM 20 MG/1
TABLET, COATED ORAL
Qty: 90 TABLET | Refills: 3 | Status: SHIPPED | OUTPATIENT
Start: 2024-01-22

## 2024-02-20 RX ORDER — TADALAFIL 20 MG/1
TABLET ORAL
Qty: 12 TABLET | Refills: 4 | Status: SHIPPED | OUTPATIENT
Start: 2024-02-20

## 2024-08-19 ENCOUNTER — TELEPHONE (OUTPATIENT)
Dept: FAMILY MEDICINE CLINIC | Facility: CLINIC | Age: 69
End: 2024-08-19

## 2024-08-21 DIAGNOSIS — I10 PRIMARY HYPERTENSION: Primary | ICD-10-CM

## 2024-08-21 DIAGNOSIS — E78.01 FAMILIAL HYPERCHOLESTEROLEMIA: ICD-10-CM

## 2024-08-21 DIAGNOSIS — N40.0 BENIGN PROSTATIC HYPERPLASIA WITHOUT LOWER URINARY TRACT SYMPTOMS: ICD-10-CM

## 2024-08-21 DIAGNOSIS — D50.9 IRON DEFICIENCY ANEMIA, UNSPECIFIED IRON DEFICIENCY ANEMIA TYPE: ICD-10-CM

## 2024-09-04 ENCOUNTER — OFFICE VISIT (OUTPATIENT)
Dept: FAMILY MEDICINE CLINIC | Facility: CLINIC | Age: 69
End: 2024-09-04
Payer: MEDICARE

## 2024-09-04 VITALS
BODY MASS INDEX: 25.67 KG/M2 | HEIGHT: 74 IN | DIASTOLIC BLOOD PRESSURE: 60 MMHG | WEIGHT: 200 LBS | SYSTOLIC BLOOD PRESSURE: 136 MMHG

## 2024-09-04 DIAGNOSIS — Z12.11 SCREEN FOR COLON CANCER: ICD-10-CM

## 2024-09-04 DIAGNOSIS — I10 PRIMARY HYPERTENSION: ICD-10-CM

## 2024-09-04 DIAGNOSIS — E11.9 TYPE 2 DIABETES MELLITUS WITHOUT COMPLICATION, UNSPECIFIED WHETHER LONG TERM INSULIN USE: ICD-10-CM

## 2024-09-04 DIAGNOSIS — F17.200 SMOKER: ICD-10-CM

## 2024-09-04 DIAGNOSIS — E11.9 TYPE 2 DIABETES MELLITUS WITHOUT COMPLICATION, UNSPECIFIED WHETHER LONG TERM INSULIN USE: Primary | ICD-10-CM

## 2024-09-04 DIAGNOSIS — Z00.00 MEDICARE ANNUAL WELLNESS VISIT, SUBSEQUENT: ICD-10-CM

## 2024-09-04 LAB
EXPIRATION DATE: ABNORMAL
HBA1C MFR BLD: 6.7 % (ref 4.5–5.7)
Lab: ABNORMAL

## 2024-09-04 PROCEDURE — 1159F MED LIST DOCD IN RCRD: CPT | Performed by: INTERNAL MEDICINE

## 2024-09-04 PROCEDURE — G0439 PPPS, SUBSEQ VISIT: HCPCS | Performed by: INTERNAL MEDICINE

## 2024-09-04 PROCEDURE — 1160F RVW MEDS BY RX/DR IN RCRD: CPT | Performed by: INTERNAL MEDICINE

## 2024-09-04 PROCEDURE — 83036 HEMOGLOBIN GLYCOSYLATED A1C: CPT | Performed by: INTERNAL MEDICINE

## 2024-09-04 PROCEDURE — 3044F HG A1C LEVEL LT 7.0%: CPT | Performed by: INTERNAL MEDICINE

## 2024-09-04 RX ORDER — TADALAFIL 20 MG/1
TABLET ORAL
Qty: 10 TABLET | Refills: 4 | Status: SHIPPED | OUTPATIENT
Start: 2024-09-04

## 2024-09-05 ENCOUNTER — PATIENT ROUNDING (BHMG ONLY) (OUTPATIENT)
Dept: FAMILY MEDICINE CLINIC | Facility: CLINIC | Age: 69
End: 2024-09-05
Payer: COMMERCIAL

## 2024-09-05 NOTE — PROGRESS NOTES
A [employer] message has been sent to patient rounding with [facility].

## 2024-09-06 DIAGNOSIS — Z13.6 ENCOUNTER FOR ABDOMINAL AORTIC ANEURYSM (AAA) SCREENING: Primary | ICD-10-CM

## 2024-09-27 LAB — NONINV COLON CA DNA+OCC BLD SCRN STL QL: NEGATIVE

## 2024-09-30 ENCOUNTER — HOSPITAL ENCOUNTER (OUTPATIENT)
Dept: ULTRASOUND IMAGING | Facility: HOSPITAL | Age: 69
Discharge: HOME | End: 2024-09-30
Admitting: INTERNAL MEDICINE
Payer: MEDICARE

## 2024-09-30 DIAGNOSIS — Z13.6 ENCOUNTER FOR ABDOMINAL AORTIC ANEURYSM (AAA) SCREENING: ICD-10-CM

## 2024-09-30 PROCEDURE — 76706 US ABDL AORTA SCREEN AAA: CPT

## 2024-10-03 RX ORDER — AMLODIPINE BESYLATE 10 MG/1
TABLET ORAL
Qty: 90 TABLET | Refills: 3 | Status: SHIPPED | OUTPATIENT
Start: 2024-10-03

## 2024-12-04 ENCOUNTER — OFFICE VISIT (OUTPATIENT)
Dept: FAMILY MEDICINE CLINIC | Facility: CLINIC | Age: 69
End: 2024-12-04
Payer: MEDICARE

## 2024-12-04 VITALS
DIASTOLIC BLOOD PRESSURE: 70 MMHG | SYSTOLIC BLOOD PRESSURE: 128 MMHG | BODY MASS INDEX: 26.56 KG/M2 | HEIGHT: 74 IN | WEIGHT: 207 LBS

## 2024-12-04 DIAGNOSIS — E11.9 TYPE 2 DIABETES MELLITUS WITHOUT COMPLICATION, UNSPECIFIED WHETHER LONG TERM INSULIN USE: ICD-10-CM

## 2024-12-04 DIAGNOSIS — I10 PRIMARY HYPERTENSION: Primary | ICD-10-CM

## 2024-12-04 DIAGNOSIS — Z23 NEED FOR INFLUENZA VACCINATION: ICD-10-CM

## 2024-12-04 LAB
EXPIRATION DATE: ABNORMAL
HBA1C MFR BLD: 6.3 % (ref 4.5–5.7)
Lab: ABNORMAL

## 2024-12-04 NOTE — PROGRESS NOTES
12/04/2024    Assessment & Plan     This 69-year-old patient presents at this time for follow-up of diabetes mellitus type 2 and hypertension.  He relates she is feeling well having had no problems in the interim of visits.  He relates he is taking his medication as prescribed.  He has had no hypoglycemic episodes in the interim.  His last hemoglobin A1c was good at 6.7.    His blood pressure shows good control at 128/70 in the left arm sitting position standard cuff.    His hemoglobin A1c today improved to 6.3.  He is currently on metformin 500 mg 1 tab p.o. every morning.  He is very active playing tennis several times a week.  Diagnoses and all orders for this visit:    1. Primary hypertension (Primary)    2. Type 2 diabetes mellitus without complication, unspecified whether long term insulin use  -     POCT glycated hemoglobin, total  -     POC Blood, Urine, Qualitative, Dipstick    3. Need for influenza vaccination  -     Fluzone High-Dose 65+yrs      Plan:  1.)  Continue metformin 500 mg 1 tab p.o. every morning for diabetes control  2.)  Follow-up and 4 months for reevaluation of diabetes.  3.)  Urine dip today.           CC: Diabetes (F/U---no other issues)  .        HPI  History of Present Illness     Subjective   Yonatan Hickman Jr. is a 69 y.o. male.      The following portions of the patient's history were reviewed and updated as appropriate: allergies, current medications, past family history, past medical history, past social history, past surgical history, and problem list.    Problem List  Patient Active Problem List   Diagnosis    Inguinal hernia of right side without obstruction or gangrene       Past Medical History  Past Medical History:   Diagnosis Date    Diabetes mellitus     Hypertension        Surgical History  History reviewed. No pertinent surgical history.    Family History  Family History   Problem Relation Age of Onset    Heart disease Mother     Heart disease Brother        Social  History  Social History    Tobacco Use      Smoking status: Some Days        Packs/day: 0.25        Years: 0.3 packs/day for 13.0 years (3.2 ttl pk-yrs)        Types: Cigars, Cigarettes        Start date: 2012      Smokeless tobacco: Never       Is the Patient a current tobacco user? No    Allergies  No Known Allergies    Current Medications    Current Outpatient Medications:     amLODIPine (NORVASC) 10 MG tablet, TAKE 1 TABLET BY MOUTH EVERY DAY, Disp: 90 tablet, Rfl: 3    hydrALAZINE (APRESOLINE) 50 MG tablet, TAKE 1 TABLET BY MOUTH TWICE A DAY, Disp: 180 tablet, Rfl: 1    metFORMIN (GLUCOPHAGE) 500 MG tablet, TAKE 1 TABLET BY MOUTH EVERY MORNING, Disp: 90 tablet, Rfl: 1    rosuvastatin (CRESTOR) 20 MG tablet, TAKE 1 TABLET BY MOUTH AT BEDTIME, Disp: 90 tablet, Rfl: 3    tadalafil (Cialis) 20 MG tablet, 1-2 weekly, Disp: 10 tablet, Rfl: 4    fexofenadine-pseudoephedrine (Allegra-D Allergy & Congestion) 180-240 MG per 24 hr tablet, Take 1 tablet p.o. nightly (Patient not taking: Reported on 12/4/2024), Disp: 10 tablet, Rfl: 0    meclizine (ANTIVERT) 25 MG tablet, TAKE 1 TABLET BY MOUTH TWICE A DAY (Patient not taking: Reported on 12/4/2024), Disp: 20 tablet, Rfl: 0    tadalafil (CIALIS) 20 MG tablet, TAKE 1 TABLET BY MOUTH ONCE A WEEK AS NEEDED (NOT COVERED) (Patient not taking: Reported on 12/4/2024), Disp: 12 tablet, Rfl: 4     Review of System  Review of Systems   Constitutional:  Negative for chills and fever.   Respiratory:  Negative for cough and shortness of breath.    Cardiovascular:  Negative for chest pain and palpitations.   Gastrointestinal:  Negative for constipation, diarrhea, nausea and vomiting.   Neurological:  Negative for dizziness and headache.     I have reviewed and confirmed the accuracy of the ROS as documented by the MA/LPN/RN Basim Jacome MD    Vitals:    12/04/24 0732   BP: 128/70     Body mass index is 26.58 kg/m².    Objective     Physical Exam  Physical Exam  Constitutional:        General: He is not in acute distress.     Appearance: Normal appearance.   HENT:      Head: Normocephalic and atraumatic.   Cardiovascular:      Rate and Rhythm: Normal rate and regular rhythm.   Pulmonary:      Effort: Pulmonary effort is normal. No respiratory distress.      Breath sounds: Normal breath sounds. No wheezing, rhonchi or rales.   Neurological:      General: No focal deficit present.      Mental Status: He is alert and oriented to person, place, and time.   Psychiatric:         Mood and Affect: Mood normal.         Behavior: Behavior normal.         Thought Content: Thought content normal.         Judgment: Judgment normal.           Basim Jacome MD  12/04/2024

## 2025-01-07 RX ORDER — ALLOPURINOL 100 MG/1
TABLET ORAL
Qty: 90 TABLET | Refills: 3 | Status: SHIPPED | OUTPATIENT
Start: 2025-01-07

## 2025-02-22 DIAGNOSIS — E78.01 FAMILIAL HYPERCHOLESTEROLEMIA: ICD-10-CM

## 2025-02-24 RX ORDER — ROSUVASTATIN CALCIUM 20 MG/1
TABLET, COATED ORAL
Qty: 90 TABLET | Refills: 3 | Status: SHIPPED | OUTPATIENT
Start: 2025-02-24

## 2025-02-24 RX ORDER — HYDRALAZINE HYDROCHLORIDE 50 MG/1
TABLET, FILM COATED ORAL
Qty: 180 TABLET | Refills: 1 | Status: SHIPPED | OUTPATIENT
Start: 2025-02-24

## 2025-04-01 ENCOUNTER — TELEPHONE (OUTPATIENT)
Dept: FAMILY MEDICINE CLINIC | Facility: CLINIC | Age: 70
End: 2025-04-01
Payer: MEDICARE

## 2025-04-29 ENCOUNTER — OFFICE VISIT (OUTPATIENT)
Dept: FAMILY MEDICINE CLINIC | Facility: CLINIC | Age: 70
End: 2025-04-29
Payer: MEDICARE

## 2025-04-29 VITALS
HEIGHT: 74 IN | DIASTOLIC BLOOD PRESSURE: 60 MMHG | SYSTOLIC BLOOD PRESSURE: 118 MMHG | BODY MASS INDEX: 25.8 KG/M2 | WEIGHT: 201 LBS

## 2025-04-29 DIAGNOSIS — I10 PRIMARY HYPERTENSION: Primary | ICD-10-CM

## 2025-04-29 DIAGNOSIS — E11.9 TYPE 2 DIABETES MELLITUS WITHOUT COMPLICATION, UNSPECIFIED WHETHER LONG TERM INSULIN USE: ICD-10-CM

## 2025-04-29 DIAGNOSIS — E78.01 FAMILIAL HYPERCHOLESTEROLEMIA: ICD-10-CM

## 2025-04-29 LAB
EXPIRATION DATE: ABNORMAL
HBA1C MFR BLD: 6.8 % (ref 4.5–5.7)
Lab: ABNORMAL

## 2025-04-29 NOTE — PROGRESS NOTES
04/29/2025    My Summary of Visit     This pleasant 69-year-old presents at this time for follow-up of hypertension.  He relates he is feeling fine with no problems in the interim of visits.  He also has a significant history of diabetes mellitus type 2.  He relates he does not check his glucose levels very frequently.  He has not had any episodes of feeling unusually tired or weak.    His blood pressure was excellent at 118/60 in the left arm sitting position standard cuff his BMI is excellent at 25.8.    His previous hemoglobin A1c was 6.34 months ago.    His hemoglobin A1c today is 6.8.    We discussed the importance of keeping his LDL cholesterol low his last level done 8 months or so ago was elevated at 127 and he is currently on Crestor 20 mg p.o. daily.    Assessment & Plan  1. Diabetes Mellitus.  - A1c level has increased from 6.3 to 6.8, remaining within the acceptable range of 6.5 to 7.0.  - Blood pressure is well-controlled at 118/60, and weight has decreased slightly.  - Importance of maintaining an A1c <7 was discussed, with good control between 6.5 and 7, and excellent control between 5.9 and 6.1.  - Advised to monitor blood sugar daily and expect better control during spring and summer due to increased activity.    2. Hyperlipidemia.  - LDL cholesterol level is 127, above the target of <70 for diabetic patients.  - Currently on Crestor 20 mg, taken regularly.  - Discussed the need for a fasting cholesterol test to obtain an updated lipid profile.  - If cholesterol levels remain high, an increase in medication will be considered.    Follow-up  - Follow-up in 4 months for diabetes management.  - Return for fasting cholesterol test at convenience.    Results  Labs   - A1c: 6.8   - A1c: 12/2024, 6.3   - LDL cholesterol: 127 mg/dL    Plan:  1.)  He will repeat his lipid profile today  2.  No changes in his diabetic regimen  3.)  Follow-up in 4 months for reevaluation of diabetes hypertension and  hyperlipidemia.           CC: Diabetes (F/U---no other issues---unable to get hr/o2)  .        HPI  Diabetes  Associated symptoms:     no chest pain    Hypoglycemia symptoms:     no dizziness       History of Present Illness  The patient presents for evaluation of diabetes and hyperlipidemia.    Diabetes management is discussed, and it is noted that he does not monitor his blood glucose levels daily. The last A1c reading, taken four months ago, was 6.3. Recent A1c has increased to 6.8, but it remains within the acceptable range of 6.5-7.0. He has been making dietary modifications and incorporating exercise into his routine, which has resulted in some weight loss. He reports feeling better when his weight is maintained between 195 to 200 pounds.    Hyperlipidemia is also addressed. He has been adhering to his prescribed Crestor 20 mg regimen. Despite this, he has a history of elevated cholesterol levels, with the last recorded LDL being 127 eight months ago. His diet includes occasional chicken consumption.     Subjective   Yonatan Hickman Jr. is a 69 y.o. male.      The following portions of the patient's history were reviewed and updated as appropriate: allergies, current medications, past family history, past medical history, past social history, past surgical history, and problem list.    Problem List  Patient Active Problem List   Diagnosis    Inguinal hernia of right side without obstruction or gangrene       Past Medical History  Past Medical History:   Diagnosis Date    Diabetes mellitus     Hypertension        Surgical History  History reviewed. No pertinent surgical history.    Family History  Family History   Problem Relation Age of Onset    Heart disease Mother     Heart disease Brother        Social History  Social History    Tobacco Use      Smoking status: Some Days        Packs/day: 0.25        Years: 0.3 packs/day for 13.3 years (3.3 ttl pk-yrs)        Types: Cigars, Cigarettes        Start date:  2012      Smokeless tobacco: Never       Is the Patient a current tobacco user? No    Allergies  No Known Allergies    Current Medications    Current Outpatient Medications:     allopurinol (ZYLOPRIM) 100 MG tablet, TAKE 1 TABLET BY MOUTH EVERY DAY, Disp: 90 tablet, Rfl: 3    amLODIPine (NORVASC) 10 MG tablet, TAKE 1 TABLET BY MOUTH EVERY DAY, Disp: 90 tablet, Rfl: 3    hydrALAZINE (APRESOLINE) 50 MG tablet, TAKE 1 TABLET BY MOUTH TWICE A DAY, Disp: 180 tablet, Rfl: 1    metFORMIN (GLUCOPHAGE) 500 MG tablet, TAKE 1 TABLET BY MOUTH EVERY MORNING, Disp: 90 tablet, Rfl: 1    rosuvastatin (CRESTOR) 20 MG tablet, TAKE 1 TABLET BY MOUTH EVERYDAY AT BEDTIME, Disp: 90 tablet, Rfl: 3    fexofenadine-pseudoephedrine (Allegra-D Allergy & Congestion) 180-240 MG per 24 hr tablet, Take 1 tablet p.o. nightly (Patient not taking: Reported on 4/29/2025), Disp: 10 tablet, Rfl: 0    meclizine (ANTIVERT) 25 MG tablet, TAKE 1 TABLET BY MOUTH TWICE A DAY (Patient not taking: Reported on 4/29/2025), Disp: 20 tablet, Rfl: 0    tadalafil (CIALIS) 20 MG tablet, TAKE 1 TABLET BY MOUTH ONCE A WEEK AS NEEDED (NOT COVERED) (Patient not taking: Reported on 4/29/2025), Disp: 12 tablet, Rfl: 4     Review of System  Review of Systems   Constitutional:  Negative for chills and fever.   Respiratory:  Negative for cough and shortness of breath.    Cardiovascular:  Negative for chest pain and palpitations.   Gastrointestinal:  Negative for constipation, diarrhea, nausea and vomiting.   Neurological:  Negative for dizziness and headache.     I have reviewed and confirmed the accuracy of the ROS as documented by the MA/LPN/RN Basim Jacome MD    Vitals:    04/29/25 0834   BP: 118/60     Body mass index is 25.81 kg/m².    Objective     Physical Exam  Physical Exam  Constitutional:       General: He is not in acute distress.     Appearance: Normal appearance.   HENT:      Head: Normocephalic and atraumatic.   Cardiovascular:      Rate and Rhythm: Normal  rate and regular rhythm.   Pulmonary:      Effort: Pulmonary effort is normal. No respiratory distress.      Breath sounds: Normal breath sounds. No wheezing, rhonchi or rales.   Neurological:      General: No focal deficit present.      Mental Status: He is alert and oriented to person, place, and time.   Psychiatric:         Mood and Affect: Mood normal.         Behavior: Behavior normal.         Thought Content: Thought content normal.         Judgment: Judgment normal.           Patient or patient representative verbalized consent for the use of Ambient Listening during the visit with  Basim Jacome MD for chart documentation. 4/29/2025  09:16 EDT    Basim Jacome MD  04/29/2025

## 2025-07-02 ENCOUNTER — OFFICE VISIT (OUTPATIENT)
Dept: FAMILY MEDICINE CLINIC | Facility: CLINIC | Age: 70
End: 2025-07-02
Payer: MEDICARE

## 2025-07-02 VITALS
DIASTOLIC BLOOD PRESSURE: 80 MMHG | HEIGHT: 74 IN | WEIGHT: 195 LBS | BODY MASS INDEX: 25.03 KG/M2 | HEART RATE: 63 BPM | OXYGEN SATURATION: 98 % | SYSTOLIC BLOOD PRESSURE: 150 MMHG

## 2025-07-02 DIAGNOSIS — F40.240 CLAUSTROPHOBIA: Primary | ICD-10-CM

## 2025-07-02 DIAGNOSIS — I10 PRIMARY HYPERTENSION: ICD-10-CM

## 2025-07-02 DIAGNOSIS — Z23 NEED FOR TDAP VACCINATION: ICD-10-CM

## 2025-07-02 RX ORDER — LORAZEPAM 0.5 MG/1
TABLET ORAL
Qty: 5 TABLET | Refills: 0 | Status: SHIPPED | OUTPATIENT
Start: 2025-07-02

## 2025-07-02 RX ORDER — AMLODIPINE BESYLATE 5 MG/1
5 TABLET ORAL DAILY
Qty: 30 TABLET | Refills: 2 | Status: SHIPPED | OUTPATIENT
Start: 2025-07-02

## 2025-07-02 NOTE — PROGRESS NOTES
07/02/2025    Assessment & Plan     This 69-year-old patient presents at this time for follow-up of hypertension.  He relates he is feeling well having had no problems in the interim of visit.  He currently is on amlodipine 10 mg p.o. daily and Apresoline 50 mg p.o. twice daily.  We note that his blood pressure is actually normal today at 120/80 in the left arm sitting position standard cuff in spite of the fact he has not had his medicines since yesterday.  He relates he just forgot to take it this morning.  He is also preparing for a trip several hours by plane and has some anxiety about flying with that.    Diagnoses and all orders for this visit:    1. Claustrophobia (Primary)  Comments:  Episodic  Orders:  -     LORazepam (Ativan) 0.5 MG tablet; Take 1/2 tablet prn  for anxiety while flying  Dispense: 5 tablet; Refill: 0    2. Need for Tdap vaccination  -     Tdap Vaccine => 8yo IM (BOOSTRIX/ADACEL)    3. Primary hypertension  Comments:  Blood pressure well-controlled  Orders:  -     amLODIPine (NORVASC) 5 MG tablet; Take 1 tablet by mouth Daily.  Dispense: 30 tablet; Refill: 2      Plan:  1.)  Decrease amlodipine to 5 mg p.o. every morning  2.)  Continue Apresoline 50 mg 2 twice daily  3.)  Follow-up in 2 to 3 months.           CC: Hypertension (F/U.  ) and Anxiety (Discuss upcoming trip.)  .        HPI  Hypertension  Associated symptoms: anxiety    Associated symptoms: no chest pain, no palpitations, no shortness of breath and no dizziness    Anxiety    Symptoms: no chest pain, no dizziness, no nausea, no palpitations and no shortness of breath         Subjective   Yonatan Hickman Jr. is a 69 y.o. male.      The following portions of the patient's history were reviewed and updated as appropriate: allergies, current medications, past family history, past medical history, past social history, past surgical history, and problem list.    Problem List  Patient Active Problem List   Diagnosis    Inguinal hernia of  right side without obstruction or gangrene       Past Medical History  Past Medical History:   Diagnosis Date    Diabetes mellitus     Hypertension        Surgical History  History reviewed. No pertinent surgical history.    Family History  Family History   Problem Relation Age of Onset    Heart disease Mother     Heart disease Brother        Social History  Social History    Tobacco Use      Smoking status: Some Days        Packs/day: 0.25        Years: 0.3 packs/day for 13.5 years (3.4 ttl pk-yrs)        Types: Cigars, Cigarettes        Start date: 2012      Smokeless tobacco: Never       Is the Patient a current tobacco user? No    Allergies  No Known Allergies    Current Medications    Current Outpatient Medications:     allopurinol (ZYLOPRIM) 100 MG tablet, TAKE 1 TABLET BY MOUTH EVERY DAY, Disp: 90 tablet, Rfl: 3    amLODIPine (NORVASC) 10 MG tablet, TAKE 1 TABLET BY MOUTH EVERY DAY, Disp: 90 tablet, Rfl: 3    hydrALAZINE (APRESOLINE) 50 MG tablet, TAKE 1 TABLET BY MOUTH TWICE A DAY, Disp: 180 tablet, Rfl: 1    metFORMIN (GLUCOPHAGE) 500 MG tablet, TAKE 1 TABLET BY MOUTH EVERY MORNING, Disp: 90 tablet, Rfl: 1    rosuvastatin (CRESTOR) 20 MG tablet, TAKE 1 TABLET BY MOUTH EVERYDAY AT BEDTIME, Disp: 90 tablet, Rfl: 3    amLODIPine (NORVASC) 5 MG tablet, Take 1 tablet by mouth Daily., Disp: 30 tablet, Rfl: 2    LORazepam (Ativan) 0.5 MG tablet, Take 1/2 tablet prn  for anxiety while flying, Disp: 5 tablet, Rfl: 0     Review of System  Review of Systems   Constitutional:  Negative for chills and fever.   Respiratory:  Negative for cough and shortness of breath.    Cardiovascular:  Negative for chest pain and palpitations.   Gastrointestinal:  Negative for constipation, diarrhea, nausea and vomiting.   Neurological:  Negative for dizziness and headache.     I have reviewed and confirmed the accuracy of the ROS as documented by the MA/LPN/RN Basim Jacome MD    Vitals:    07/02/25 0758   BP: 150/80   Pulse: 63    SpO2: 98%     Body mass index is 25.04 kg/m².    Objective     Physical Exam  Physical Exam  Constitutional:       General: He is not in acute distress.     Appearance: Normal appearance.   HENT:      Head: Normocephalic and atraumatic.   Cardiovascular:      Rate and Rhythm: Normal rate and regular rhythm.   Pulmonary:      Effort: Pulmonary effort is normal. No respiratory distress.      Breath sounds: Normal breath sounds. No wheezing, rhonchi or rales.   Neurological:      General: No focal deficit present.      Mental Status: He is alert and oriented to person, place, and time.   Psychiatric:         Mood and Affect: Mood normal.         Behavior: Behavior normal.         Thought Content: Thought content normal.         Judgment: Judgment normal.             Basim Jacome MD  07/02/2025

## 2025-08-20 ENCOUNTER — TELEPHONE (OUTPATIENT)
Dept: FAMILY MEDICINE CLINIC | Facility: CLINIC | Age: 70
End: 2025-08-20
Payer: MEDICARE